# Patient Record
(demographics unavailable — no encounter records)

---

## 2024-10-23 NOTE — ASSESSMENT
[FreeTextEntry1] :    Emphasized the need to reduce calories for what her current patterns are and to hopefully increase physical activity as well. We discussed menu selection as well as food preparation techniques.  Educated patient on 150 minutes of moderate intensity exercise weekly with strength training twice weekly. Encouraged patient to monitor physical activity  Discussed the importance of a balanced, healthy diet of nourishing foods and consistent meal pattern for long-term success and health maintenance. Encouraged to increase water intake to facilitate adequate hydration and to cut out sugary drinks and alcohol. I encouraged the patient to keep food records in order to better track calorie consumed.   Plan:  To continue with nutrition counseling Medication: cont metformin, sent refill encouraged PA and nutrition counseling f/u in 2 months

## 2024-10-23 NOTE — HISTORY OF PRESENT ILLNESS
[Home] : at home, [unfilled] , at the time of the visit. [Medical Office: ([facility])___] : at the medical office located in  [Verbal consent obtained from patient] : the patient, [unfilled] [FreeTextEntry1] : Pt presenting for f/u of medical weight management.    Diet: trying to increase protein and reduce snacking. noticing less craving  Exercise: trying to walk not able to everyday Sleep: 6-7 hrs/night. Reports not waking up in the middle of the night or waking up tired in AM  Stress: Job  Medications and tolerance:  Pt reports feeling as though the medications are helping with her weight.

## 2024-12-20 NOTE — ASSESSMENT
[FreeTextEntry1] :    Emphasized the need to reduce calories for what her current patterns are and to hopefully increase physical activity as well. We discussed menu selection as well as food preparation techniques.  Educated patient on 150 minutes of moderate intensity exercise weekly with strength training twice weekly. Encouraged patient to monitor physical activity  Discussed the importance of a balanced, healthy diet of nourishing foods and consistent meal pattern for long-term success and health maintenance. Encouraged to increase water intake to facilitate adequate hydration and to cut out sugary drinks and alcohol. I encouraged the patient to keep food records in order to better track calorie consumed.   Plan:  To continue with nutrition counseling Medication: increase metformin 1000 mg BID as tolerated cont nutrition f/u  f/u in 2 months

## 2024-12-20 NOTE — HISTORY OF PRESENT ILLNESS
[Home] : at home, [unfilled] , at the time of the visit. [Medical Office: ([facility])___] : at the medical office located in  [Verbal consent obtained from patient] : the patient, [unfilled] [FreeTextEntry1] : Pt presenting for f/u of medical weight management.   Medication:  Pt currently taking metformin 500 mg BID  Has not significant AE with meds    Has not reached goal weight at this time,        Diet: trying to decrease portions and snacks throughout the day has been trying to increase protein intake    Exercise: working on trying to do Cardio+ Weights (3-5 days/week) 30 minutes per session. Currently is moderately physical active.   Sleep: 6-7 hrs/night. Reports not waking up in the middle of the night or waking up tired in AM   Stress: Job   Medications and tolerance: Pt reports feeling as though the medications are helping with her weight.

## 2025-02-20 NOTE — HISTORY OF PRESENT ILLNESS
[Home] : at home, [unfilled] , at the time of the visit. [Medical Office: ([facility])___] : at the medical office located in  [Verbal consent obtained from patient] : the patient, [unfilled] [FreeTextEntry1] : Pt presenting for f/u of medical weight management.   Medication:  Pt currently taking metformin 1000 mg BID  Has not significant AE with meds    Has not reached goal weight at this time,        Diet: trying to decrease portions and snacks throughout the day has been trying to increase protein intake    Exercise: working on trying to do Cardio+ Weights (3-5 days/week) 30 minutes per session. Currently is moderately physical active.   Sleep: 6-7 hrs/night. Reports not waking up in the middle of the night or waking up tired in AM   Stress: Job   Medications and tolerance: Pt reports feeling as though the medications are helping with her weight.

## 2025-02-20 NOTE — ASSESSMENT
[FreeTextEntry1] :    Emphasized the need to reduce calories for what her current patterns are and to hopefully increase physical activity as well. We discussed menu selection as well as food preparation techniques.  Educated patient on 150 minutes of moderate intensity exercise weekly with strength training twice weekly. Encouraged patient to monitor physical activity  Discussed the importance of a balanced, healthy diet of nourishing foods and consistent meal pattern for long-term success and health maintenance. Encouraged to increase water intake to facilitate adequate hydration and to cut out sugary drinks and alcohol. I encouraged the patient to keep food records in order to better track calorie consumed.   Plan:  To continue with nutrition counseling Medication: cont metformin 1000 mg BID as tolerated insurance does not cover GLP1, cannot afford OOP  cont nutrition f/u  f/u in 2 months  has RBA schedule next week.

## 2025-03-07 NOTE — ASSESSMENT
[FreeTextEntry1] : Previous visit weight: 201.6 lbs Today's weight: 198. lbs Today's BMI:37.4  Patient is following with medical weight loss management and currently on Metformin for the last 3 months. Denies any side effects of medication. Recommend patient to follow with registered dietitian for further nutritional guidance.

## 2025-03-07 NOTE — PHYSICAL EXAM
[Normal] : affect appropriate [de-identified] : soft, nontender. no rebound or guarding.  incisions healed

## 2025-03-07 NOTE — PLAN
[FreeTextEntry1] : Recommend continue 1-to-1 sessions with registered dietitian  Maintain 64 oz water/fluid intake and >75g protein intake per day. Pt understands and agrees  Maintain activity level / exercise  Return to office in 6 months
